# Patient Record
Sex: FEMALE | Race: ASIAN | NOT HISPANIC OR LATINO | URBAN - METROPOLITAN AREA
[De-identification: names, ages, dates, MRNs, and addresses within clinical notes are randomized per-mention and may not be internally consistent; named-entity substitution may affect disease eponyms.]

---
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
## 2025-04-10 NOTE — H&P PST ADULT - NS PRO REGISTERED ORGAN DONOR
Operative Note    Patient: Aimee V Damico 65 year old female    MRN: [id number]    Surgeon(s): John Paul Wharton MD  Phone Number: [phone]                       Surgeons and Role:     * John Paul Wharton MD - Primary        Pre-Op Diagnosis: PAD     Post-Op Diagnosis: PAD     Procedure: Procedure(s):  Percutaneous retrograde access of the right common femoral artery  Abdominal and iliac angiogram with cathter placement in the abdominal aorta  Right leg angiogram with sheath in the right common femoral artery  Right common iliac artery angioplasty using a 7mm balloon and right external iliac artery angioplasty using a 7mm balloon    Anesthesia Type: Moderate sedation was administered with continuous monitoring of the patient by a trained caregiver under my direct supervision. Please refer to nursing documentation for doses of medications administered intravenously as well as the patient’s status during the procedure. Total sedation (intra-service) time was 60 minutes.                                      Complications: None    Description: as above    Findings:  Abdominal aorta with ectasia  Right common iliac artery with  65% and right external iliac artery in-stent 40%; angioplasty with no residual stenosis  Patent right common and deep femoral arteries. Right superficial femoral artery stenosis and occlusion with reconstitution of the below knee popliteal artery which is of small caliber and moderate stenosis of 50% just above the trifurcation  Small caliber anterior tibial artery, densely calcified in the ostia and proximal aspect, larger caliber with tandem stenoses through mid portion and occluded distally. Unable to visualize peroneal or posterior tibial runoff, but collaterals are noted.  CT angiogram compared to angiogram today. Discussed with patient vein mapping does not reveal vein of good caliber for bypass and she also does not seem to have adequate target for bypass based on angiogram today and  CT findings. Discussed with the patient option of on table angiogram through below knee popliteal to see if there is a good target. If not, given her unrelenting ischemic rest pain the other option would be a below knee bypass. Patient and daughter which to discuss options and will let me know.     Specimens Removed: No specimens collected     Estimated Blood Loss:  5-mL         Implants: * No implants in log *    Indications for intervention: 65yoF with ischemic rest pain and occlusion of prior placed stents of the SFA and stenosis of iliac arteries. Owing to this recommendation for diagnostic angiogram with possible intervention. Indications, risks, benefits, and alternatives of procedure were discussed wit the patient in detail prior to procedure. All questions answered. She voiced understanding and wished to proceed with the above stated elective intervention on the above stated date.     Description of procedure: After obtaining informed consent, patient was brought back to the catherization suite. She was placed supine on the table and her bilateral groins were prepped and draped in the usual sterile fashion. A time out was performed and sedation was stated under my guidance. Please see nursing documentation for complete results. Retrograde right common femoral artery access was obtained using ultrasound.Micropuncture kit used and then 5 Fr sheath placed.  Image was saved for documentation purposes.  Catheter was placed in the terminal aorta and aortobifemoral angiogram was obtained . With findings as detailed above. Weight based heparin bolus was then administered and ACT followed throughout the case to maintain ACT >300. Following this angioplasty of the common iliac and external iliac arteries were performed using 7mm drug coated balloons. Post-angioplasty no residual stenosis. Following this multiple angiograms of the leg were performed through sheath in the right common femoral artery and the catheter in  the external iliac artery. Findings as above. This concluded the procedure. Sheath was secured to skin and flushed.  Drapes were taken down and patient transferred to the recovery unit in stable condition.     I was present for the key portions of the procedure and was immediately available for the non-key portions         No